# Patient Record
Sex: MALE | Race: ASIAN | URBAN - METROPOLITAN AREA
[De-identification: names, ages, dates, MRNs, and addresses within clinical notes are randomized per-mention and may not be internally consistent; named-entity substitution may affect disease eponyms.]

---

## 2019-09-24 ENCOUNTER — TELEPHONE (OUTPATIENT)
Dept: ORTHOPEDIC SURGERY | Age: 74
End: 2019-09-24

## 2019-09-24 ENCOUNTER — OFFICE VISIT (OUTPATIENT)
Dept: ORTHOPEDIC SURGERY | Age: 74
End: 2019-09-24

## 2019-09-24 VITALS
WEIGHT: 130 LBS | OXYGEN SATURATION: 97 % | RESPIRATION RATE: 16 BRPM | HEART RATE: 64 BPM | TEMPERATURE: 97.6 F | BODY MASS INDEX: 23.04 KG/M2 | SYSTOLIC BLOOD PRESSURE: 133 MMHG | DIASTOLIC BLOOD PRESSURE: 73 MMHG | HEIGHT: 63 IN

## 2019-09-24 DIAGNOSIS — S93.492A SPRAIN OF ANTERIOR TALOFIBULAR LIGAMENT OF LEFT ANKLE, INITIAL ENCOUNTER: ICD-10-CM

## 2019-09-24 DIAGNOSIS — M79.672 LEFT FOOT PAIN: ICD-10-CM

## 2019-09-24 DIAGNOSIS — S92.025A CLOSED NONDISPLACED FRACTURE OF ANTERIOR PROCESS OF LEFT CALCANEUS, INITIAL ENCOUNTER: Primary | ICD-10-CM

## 2019-09-24 DIAGNOSIS — S93.491A SPRAIN OF ANTERIOR TALOFIBULAR LIGAMENT OF RIGHT ANKLE, INITIAL ENCOUNTER: ICD-10-CM

## 2019-09-24 RX ORDER — ROSUVASTATIN CALCIUM 20 MG/1
20 TABLET, COATED ORAL
COMMUNITY

## 2019-09-24 RX ORDER — METOPROLOL SUCCINATE 100 MG/1
TABLET, EXTENDED RELEASE ORAL DAILY
COMMUNITY

## 2019-09-24 NOTE — TELEPHONE ENCOUNTER
Rocio Bright from [address] [place] [address] [facility] called and stated wanted clarification on order rather patient was getting home physical therapy or going to a facility. Please clarify, may speak to anyone in intake.      Best contact number for intake [phone]

## 2019-09-24 NOTE — PATIENT INSTRUCTIONS
Broken Foot: Care Instructions Your Care Instructions A broken foot, or foot fracture, is a break in one or more of the bones in your foot. It may happen because of a sports injury, a fall, or other accident. A compound, or open, fracture occurs when a bone breaks through the skin. A break that does not poke through the skin is a closed fracture. Your treatment depends on the location and type of break in your foot. You may need a splint, a cast, or an orthopedic shoe. Certain kinds of injuries may need surgery at some time. Whatever your treatment, you can ease symptoms and help your foot heal with care at home. You may need 6 to 8 weeks or more to fully heal. 
You heal best when you take good care of yourself. Eat a variety of healthy foods, and don't smoke. Follow-up care is a key part of your treatment and safety. Be sure to make and go to all appointments, and call your doctor if you are having problems. It's also a good idea to know your test results and keep a list of the medicines you take. How can you care for yourself at home? · Be safe with medicines. Take pain medicines exactly as directed. ? If the doctor gave you a prescription medicine for pain, take it as prescribed. ? If you are not taking a prescription pain medicine, ask your doctor if you can take an over-the-counter medicine. · Leave the splint on until your follow-up appointment. Do not put any weight on the injured foot. If you were given crutches, use them as directed. · Put ice or a cold pack on your foot for 10 to 20 minutes at a time. Try to do this every 1 to 2 hours for the next 3 days (when you are awake) or until the swelling goes down. Put a thin cloth between the ice and your skin. · Prop up the sore foot on a pillow anytime you sit or lie down during the next 3 days. Try to keep it above the level of your heart. This will help reduce swelling. · Follow the cast care instructions your doctor gives you. If you have a splint, do not take it off unless your doctor tells you to. Cast and splint care · If you have a removable splint, ask your doctor if it is okay to remove it to bathe. Your doctor may want you to keep it on as much as possible. · Keep your plaster splint covered by taping a sheet of plastic around it when you bathe. Water under the plaster can cause your skin to itch and hurt. · Never cut your splint. When should you call for help? Call 911 anytime you think you may need emergency care. For example, call if: 
  · You have chest pain, are short of breath, or you cough up blood.  
 Call your doctor now or seek immediate medical care if: 
  · You have new or worse pain.  
  · Your foot is cool or pale or changes color.  
  · You have tingling, weakness, or numbness in your toes.  
  · Your cast or splint feels too tight.  
  · You have signs of a blood clot in your leg (called a deep vein thrombosis), such as: 
? Pain in your calf, back of the knee, thigh, or groin. ? Redness or swelling in your leg.  
 Watch closely for changes in your health, and be sure to contact your doctor if: 
  · You have a problem with your splint or cast.  
  · You do not get better as expected. Where can you learn more? Go to http://song-nav.info/. Enter I829 in the search box to learn more about \"Broken Foot: Care Instructions. \" Current as of: June 26, 2019 Content Version: 12.2 © 7659-8477 [employer]. Care instructions adapted under license by [employer] (which disclaims liability or warranty for this information). If you have questions about a medical condition or this instruction, always ask your healthcare professional. [address] any warranty or liability for your use of this information. Ankle Sprain: Care Instructions Your Care Instructions An ankle sprain can happen when you twist your ankle. The ligaments that support the ankle can get stretched and torn. Often the ankle is swollen and painful. Ankle sprains may take from several weeks to several months to heal. Usually, the more pain and swelling you have, the more severe your ankle sprain is and the longer it will take to heal. You can heal faster and regain strength in your ankle with good home treatment. It is very important to give your ankle time to heal completely, so that you do not easily hurt your ankle again. Follow-up care is a key part of your treatment and safety. Be sure to make and go to all appointments, and call your doctor if you are having problems. It's also a good idea to know your test results and keep a list of the medicines you take. How can you care for yourself at home? · Prop up your foot on pillows as much as possible for the next 3 days. Try to keep your ankle above the level of your heart. This will help reduce the swelling. · Follow your doctor's directions for wearing a splint or elastic bandage. Wrapping the ankle may help reduce or prevent swelling. · Your doctor may give you a splint, a brace, an air stirrup, or another form of ankle support to protect your ankle until it is healed. Wear it as directed while your ankle is healing. Do not remove it unless your doctor tells you to. After your ankle has healed, ask your doctor whether you should wear the brace when you exercise. · Put ice or cold packs on your injured ankle for 10 to 20 minutes at a time. Try to do this every 1 to 2 hours for the next 3 days (when you are awake) or until the swelling goes down. Put a thin cloth between the ice and your skin. · You may need to use crutches until you can walk without pain. If you do use crutches, try to bear some weight on your injured ankle if you can do so without pain. This helps the ankle heal. 
· Take pain medicines exactly as directed. ? If the doctor gave you a prescription medicine for pain, take it as prescribed. ? If you are not taking a prescription pain medicine, ask your doctor if you can take an over-the-counter medicine. · If you have been given ankle exercises to do at home, do them exactly as instructed. These can promote healing and help prevent lasting weakness. When should you call for help? Call your doctor now or seek immediate medical care if: 
  · Your pain is getting worse.  
  · Your swelling is getting worse.  
  · Your splint feels too tight or you are unable to loosen it.  
 Watch closely for changes in your health, and be sure to contact your doctor if: 
  · You are not getting better after 1 week. Where can you learn more? Go to http://song-nav.info/. Enter J198 in the search box to learn more about \"Ankle Sprain: Care Instructions. \" Current as of: June 26, 2019 Content Version: 12.2 © 2115-4670 [employer] [place]. Care instructions adapted under license by [employer] (which disclaims liability or warranty for this information). If you have questions about a medical condition or this instruction, always ask your healthcare professional. [address] any warranty or liability for your use of this information.

## 2019-09-24 NOTE — PROGRESS NOTES
AMBULATORY PROGRESS NOTE      Patient: Raoul Busby             MRN: [id number]     SSN: [id number] Body mass index is 23.03 kg/m². YOB: 1945     AGE: 75 y.o. SEX: male    PCP: Deny Saenz MD     IMPRESSION/DIAGNOSIS AND TREATMENT PLAN     DIAGNOSES  1. Closed nondisplaced fracture of anterior process of left calcaneus, initial encounter    2. Sprain of anterior talofibular ligament of left ankle, initial encounter    3. Sprain of anterior talofibular ligament of right ankle, initial encounter    4. Left foot pain        Orders Placed This Encounter    AMB SUPPLY ORDER    [87830] Foot Min 3V    REFERRAL TO PHYSICAL THERAPY    [address] [place] [address]      Raoul Busby understands his diagnoses and the proposed plan. I spent a lot of time with Dr. Herb Bustos. My impression is that he has at least a grade two left ankle sprain and a left, minimally displaced avulsion fracture to the anterior process of the calcaneus. Recommendations are listed as below:     1. Physical therapy for the left ankle. 2. AirSport AirCast to the right ankle for mild right ankle sprain. My second diagnosis is mild right ankle sprain. He arrives here with images on a CD/DVD, which I did review. These are x-rays of his right ankle and left ankle with an MRI of the left ankle. The results are listed in the diagnostic section of my report. I did review the MRI of the left ankle, as well as the x-rays of the left ankle and x-rays of the right ankle. The MRI of the left ankle showed an acute fracture at the anterior process of the left calcaneus, a tear of the anterior talofibular ligamentous complex, mild Achilles tendinosis, and an old osteochondral lesion in the medial talar dome that appeared stable without edema around it. The ankle films on the left dated August 29, 2019, revealed an avulsion fracture along the lateral aspect of the hindfoot.       The MRI, as I dictated above, was done on September 9, 2019. X-rays of the right ankle, three views, on August 29, 2019, showed no acute fracture, subluxation, or dislocation. I did obtain x-rays of his left foot in my office, as he did not have any x-rays at the time of his injury, and the alignment looks good. There is some mild OA at the number two TMT region. Otherwise, no fracture, subluxation, or dislocation is seen to the left foot. No incidental bone spurs are seen to the inferior surface of the calcaneus. The plan will be to see him in three weeks. Hopefully, we can wean him out of the CAM walker boot in three weeks and get him into an AirSport AirCast brace on this left side. Plan:    1) Referral to Physical Therapy. 2) DME Order: Right AS/AC brace. 3) Perform gentle ROM exercises as directed. 4) Continue using the CAM walker boot for an additional three weeks. 5) Continue activity modification as directed. RTO - 3 weeks     HPI AND EXAMINATION     Rosio Miramontes IS A 75 y.o. male who presents to my outpatient office complaining of left ankle pain. Dr. Katrin Busby reports that on nearly 1 month ago, he missed a step, causing him to fall and injure his left foot. He recalls that he sustained an inversion injury. He was seen at a [place] ED for this injury where he had x-rays taken and was given a CAM walker boot. He states that he has been experiencing pain and swelling in his left ankle. He adds that he has also been experiencing pain in his right ankle, as well. The patient denies any kidney, lung, or heart diseases, and states that he is otherwise healthy. Date of injury: 08/29/2019. Visit Vitals  /73   Pulse 64   Temp 97.6 °F (36.4 °C) (Oral)   Resp 16   Ht 5' 3\" (1.6 m)   Wt 130 lb (59 kg)   SpO2 97%   BMI 23.03 kg/m²     Appearance: Alert, well appearing and pleasant patient who is in no distress, oriented to person, place/time, and who follows commands.  He is accompanied by his wife and son. Psychiatric: Affect and mood are appropriate. Cardiovascular/Peripheral Vascular: Normal Pulses to each hand and foot  Musculoskeletal:  LOCATION:  Left FOOT/ANKLE  Integumentary: No rashes, skin patches, wounds, or abrasions to the right or left legs       Warm and normal color. No regions of expressible drainage. Swelling to anterolateral Ankle mild present    Swelling to the dorsal midfoot over the 1st/2nd TMT region is mild present    Swelling to Medial Ankle not present      Gait: antalgic, in CAM boot      Tenderness: ATFL/CFL Anterolateral ankle ligaments tenderness is moderate present   1st/2nd TMT tenderness is present   Negative piano key test   Anterior Syndesmosis is not present    Medial deltoid ligament tenderness is not present    Peroneal tendon sheath not present    4/5 Metatarsal base tenderness is not present     Midfoot tenderness is not present    Achilles tenderness is not present    Cuboid tenderness is not present     Proximal fibula tenderness: is not present      Motor Strength/Tone Exam: Normal to the toes with respect to extension/flexion      Sensory Exam:   Intact Normal Sensation to ankle/foot      Stability Testing: Peroneal tendon instability tests: not conducted due to tenderness              Stability of ankle and subtalar region not tested due to tenderness      ROM: Not tested at this time        Contractures: No Achilles or Gastrocnemius Contractures      Calf tenderness: Absent for calf or gastrocnemius muscle regions       Soft, supple, non tender, non taut lower extremity compartments       Alignment: Neutral Hindfoot  Wounds/Abrasions:   None present  Extremities:   No embolic phenomena to the toes or hands         No significant edema to the foot and or toes.         Lower extremities are warm and appear well perfused    DVT: No evidence of DVT seen on examination at this time     No calf swelling, no tenderness to calf muscles  Lymphatic:  No Evidence of Lymphedema  Vascular: Medial Border of Tibia Region: Edema is not present        Pulses: Dorsalis Pedis &  Posterior Tibial Pulses : Palpable yes        Varicosities Lower Limbs :  None    Neuro: Negative bilateral Straight leg raise (seated position)    See Musculoskeletal section for pertinent individual extremity examination    No abnormal hand/wrist, foot/ankle, or facial/neck tremors. ANKLE/FOOT right    Tenderness: Mild tenderness to the ATFL/CFL  Cutaneous: Mild swelling to the distal fibula  Joint Motion: Full ROM. Tenderness with circumduction. Joint / Tendon Stability: No Ankle or Subtalar instability or joint laxity, good end point. No peroneal sublux ability or dislocation  Alignment: Forefoot, Midfoot, Hindfoot WNL. Neuro Motor/Sensory: NL/NL. Vascular: NL foot/ankle pulses. Lymphatics: No extremity lymphedema, No calf swelling, no tenderness to calf muscles. CHART REVIEW     Past Medical History:   Diagnosis Date    Hypertension      Current Outpatient Medications   Medication Sig    metoprolol succinate (TOPROL-XL) 100 mg tablet Take  by mouth daily.  rosuvastatin (CRESTOR) 20 mg tablet Take 20 mg by mouth nightly. No current facility-administered medications for this visit. Allergies   Allergen Reactions    Iodine Rash    Penicillins Rash     History reviewed. No pertinent surgical history. Social History     Occupational History    Not on file   Tobacco Use    Smoking status: Never Smoker    Smokeless tobacco: Never Used   Substance and Sexual Activity    Alcohol use: Not on file    Drug use: Not on file    Sexual activity: Not on file     Family History   Problem Relation Age of Onset    No Known Problems Mother         REVIEW OF SYSTEMS : 9/24/2019  ALL BELOW ARE Negative except : SEE HPI     Constitutional: Negative for fever, chills and weight loss.  Neg Weight Loss  Cardiovascular: Negative for chest pain, claudication and leg swelling. SOB, ODONNELL   Gastrointestinal/Urological: Negative for  pain, N/V/D/C, Blood in stool or urine,dysuria                         Hematuria, Incontinence, pelvic pain  Musculoskeletal: see HPI. Neurological: Negative for dizziness and weakness, headaches,Visual Changes             Confusion,  Or Seizures,   Psychiatric/Behavioral: Negative for depression, memory loss and substance abuse. Extremities:  Negative for hair changes, rash or skin lesion changes. Hematologic: Negative for Bleeding problems, bruising, pallor or swollen lymph nodes. Peripheral Vascular: No calf pain, vascular vein tenderness to calf pain              No calf throbbing, posterior knee throbbing pain     DIAGNOSTIC IMAGING     No notes on file     MR ANKLE WO IV CON LEFT9/9/2019  [address]  Result Impression     1.  Acute fracture involving the anterior process of the calcaneus as described. 2.  Tear of the anterior talofibular ligament. 3.  Mild tendinosis of the Achilles tendon. 4.  Osteochondral lesion involving the medial aspect of the talar dome. Signed By: Fermin Caro MD on 9/9/2019 8:40 AM   Result Narrative   Indication:  Closed displaced fracture of anterior process of left calcaneus. Technique: MRI examination of the left ankle was performed utilizing standard departmental protocol.      Comparison: Left ankle x-ray, 8/29/2019. Findings: There is a fracture involving the anterior process of the calcaneus with adjacent bone marrow edema.  The fracture fragment measures approximately 5 x 3 x 7 mm and is displaced 4 mm.  No significant healing across the fracture is seen at this time.  Adjacent soft tissue edema is present.  The soft tissue edema extends down the lateral dorsum of the foot. There is an osteochondral lesion involving the medial aspect of the talar dome.  No displaced or unstable osteochondral fragment is identified.     The anterior talofibular ligament appears to be torn.  Adjacent anterolateral ankle soft tissue swelling is present.  The posterior talofibular ligament and the anterior and posterior tibiofibular ligaments are intact. The calcaneofibular ligament is intact. The deltoid ligament is intact.      The posterior tibial, flexor hallucis longus, and flexor digitorum longus tendons do not show any morphologic abnormality.  No abnormal intrasubstance T2 signal is identified. The peroneus longus and peroneus brevis tendons are normal in course and caliber and do not show any abnormal intrasubstance T2 signal.    The extensor tendons of the ankle are within normal limits. Mild intermediate intrasubstance signal is noted in the distal Achilles tendon, suggesting mild tendinosis.  No full-thickness tear or tendon retraction are identified. The sinus tarsi is unremarkable. The plantar fascia is not thickened and there is no signal alteration. A small ankle joint effusion is present.  There is mild diffuse ankle soft tissue swelling. Other Result Information   Interface, [employer] [facility] - 09/09/2019  8:44 AM EDT  Indication:  Closed displaced fracture of anterior process of left calcaneus. Technique: MRI examination of the left ankle was performed utilizing standard departmental protocol. Comparison: Left ankle x-ray, 8/29/2019. Findings: There is a fracture involving the anterior process of the calcaneus with adjacent bone marrow edema. The fracture fragment measures approximately 5 x 3 x 7 mm and is displaced 4 mm. No significant healing across the fracture is seen at this time. Adjacent soft tissue edema is present. The soft tissue edema extends down the lateral dorsum of the foot. There is an osteochondral lesion involving the medial aspect of the talar dome. No displaced or unstable osteochondral fragment is identified. The anterior talofibular ligament appears to be torn.   Adjacent anterolateral ankle soft tissue swelling is present. The posterior talofibular ligament and the anterior and posterior tibiofibular ligaments are intact. The calcaneofibular ligament is intact. The deltoid ligament is intact. The posterior tibial, flexor hallucis longus, and flexor digitorum longus tendons do not show any morphologic abnormality. No abnormal intrasubstance T2 signal is identified. The peroneus longus and peroneus brevis tendons are normal in course and caliber and do not show any abnormal intrasubstance T2 signal.    The extensor tendons of the ankle are within normal limits. Mild intermediate intrasubstance signal is noted in the distal Achilles tendon, suggesting mild tendinosis. No full-thickness tear or tendon retraction are identified. The sinus tarsi is unremarkable. The plantar fascia is not thickened and there is no signal alteration. A small ankle joint effusion is present. There is mild diffuse ankle soft tissue swelling. IMPRESSION    1. Acute fracture involving the anterior process of the calcaneus as described. 2.  Tear of the anterior talofibular ligament. 3.  Mild tendinosis of the Achilles tendon. 4.  Osteochondral lesion involving the medial aspect of the talar dome. XR - ANKLE COMPLETE LT8/29/2019  formerly [facility]  Result Impression     Suspected avulsion fracture involving the lateral aspect of the hindfoot. Signed By: Dez Rizvi MD on 8/29/2019 3:41 PM   Result Narrative   Left ankle, three views. History:  Trauma. Comparison:  None available. Bone fragments are noted along the lateral aspect of the hindfoot which is suspicious for an avulsion fracture.  Lateral ankle soft tissue swelling and an ankle joint effusion are noted.  No dislocation is identified.  The ankle mortise is not widened. Other Result Information   Interface, [employer] - 08/29/2019  3:44 PM EDT  Left ankle, three views. History:  Trauma.     Comparison:  None available. Bone fragments are noted along the lateral aspect of the hindfoot which is suspicious for an avulsion fracture. Lateral ankle soft tissue swelling and an ankle joint effusion are noted. No dislocation is identified. The ankle mortise is not widened. IMPRESSION    Suspected avulsion fracture involving the lateral aspect of the hindfoot. XR - ANKLE COMPLETE RT8/29/2019  [facility]  Result Impression     No acute fracture or dislocation identified. Signed By: Glenroy Dixon MD on 8/29/2019 3:39 PM   Result Narrative   Right ankle, 4 views. History:  Trauma. Comparison:  None available. There is no evidence of acute fracture or dislocation.  The ankle mortise is not widened.  The soft tissues are unremarkable. Other Result Information   Interface, [employer] [facility] - 08/29/2019  3:41 PM EDT  Right ankle, 4 views. History:  Trauma. Comparison:  None available. There is no evidence of acute fracture or dislocation. The ankle mortise is not widened. The soft tissues are unremarkable. IMPRESSION    No acute fracture or dislocation identified. Please see above section of this report. I have personally reviewed the results of the above study. The interpretation of this study is my professional opinion. Written by Nano Stafford, as dictated by Dr. Roberto Bates. I, Dr. Roberto Bates, confirm that all documentation is accurate.

## 2019-09-24 NOTE — PROGRESS NOTES
Verbal order given by Dr. Laurel Melendez to sign order for x-ray entered by [employer]. Verbal order given by Dr. Laurel Melendez to sign order for PT and DME entered by [employer].

## 2019-09-24 NOTE — PROGRESS NOTES
1. Have you been to the ER, urgent care clinic since your last visit? Hospitalized since your last visit? No    2. Have you seen or consulted any other health care providers outside of the [address] [place] [address] since your last visit? Include any pap smears or colon screening.  No

## 2019-09-25 ENCOUNTER — HOME HEALTH ADMISSION (OUTPATIENT)
Dept: HOME HEALTH SERVICES | Facility: HOME HEALTH | Age: 74
End: 2019-09-25

## 2019-09-25 NOTE — TELEPHONE ENCOUNTER
[facility] from [address] [place] [address] Tc Jett called back and stated that they can't do both home PT and outpatient PT.  It has to be one or the other.      [facility] can be reached at : [phone]

## 2019-09-25 NOTE — TELEPHONE ENCOUNTER
At this time,patient is unable to drive.   Patient is to have home PT, followed by formal outpatient PT.

## 2019-09-26 ENCOUNTER — HOME CARE VISIT (OUTPATIENT)
Dept: SCHEDULING | Facility: HOME HEALTH | Age: 74
End: 2019-09-26